# Patient Record
Sex: MALE | Race: WHITE | NOT HISPANIC OR LATINO | Employment: FULL TIME | URBAN - METROPOLITAN AREA
[De-identification: names, ages, dates, MRNs, and addresses within clinical notes are randomized per-mention and may not be internally consistent; named-entity substitution may affect disease eponyms.]

---

## 2023-07-17 LAB
ALANINE AMINOTRANSFERASE (SGPT) (U/L) IN SER/PLAS: 18 U/L (ref 10–52)
ALBUMIN (G/DL) IN SER/PLAS: 4.2 G/DL (ref 3.4–5)
ALKALINE PHOSPHATASE (U/L) IN SER/PLAS: 51 U/L (ref 33–136)
ANION GAP IN SER/PLAS: 11 MMOL/L (ref 10–20)
ASPARTATE AMINOTRANSFERASE (SGOT) (U/L) IN SER/PLAS: 18 U/L (ref 9–39)
BILIRUBIN TOTAL (MG/DL) IN SER/PLAS: 0.6 MG/DL (ref 0–1.2)
CALCIUM (MG/DL) IN SER/PLAS: 8.7 MG/DL (ref 8.6–10.3)
CARBON DIOXIDE, TOTAL (MMOL/L) IN SER/PLAS: 26 MMOL/L (ref 21–32)
CHLORIDE (MMOL/L) IN SER/PLAS: 107 MMOL/L (ref 98–107)
CHOLESTEROL (MG/DL) IN SER/PLAS: 173 MG/DL (ref 0–199)
CHOLESTEROL IN HDL (MG/DL) IN SER/PLAS: 50.4 MG/DL
CHOLESTEROL/HDL RATIO: 3.4
CREATININE (MG/DL) IN SER/PLAS: 1.11 MG/DL (ref 0.5–1.3)
GFR MALE: 75 ML/MIN/1.73M2
GLUCOSE (MG/DL) IN SER/PLAS: 108 MG/DL (ref 74–99)
LDL: 102 MG/DL (ref 0–99)
POTASSIUM (MMOL/L) IN SER/PLAS: 4.5 MMOL/L (ref 3.5–5.3)
PROTEIN TOTAL: 6.3 G/DL (ref 6.4–8.2)
SODIUM (MMOL/L) IN SER/PLAS: 139 MMOL/L (ref 136–145)
TRIGLYCERIDE (MG/DL) IN SER/PLAS: 104 MG/DL (ref 0–149)
UREA NITROGEN (MG/DL) IN SER/PLAS: 29 MG/DL (ref 6–23)
VLDL: 21 MG/DL (ref 0–40)

## 2023-10-20 ENCOUNTER — TELEPHONE (OUTPATIENT)
Dept: PRIMARY CARE | Facility: CLINIC | Age: 63
End: 2023-10-20
Payer: COMMERCIAL

## 2023-10-20 DIAGNOSIS — Z12.11 COLON CANCER SCREENING: Primary | ICD-10-CM

## 2023-10-20 NOTE — TELEPHONE ENCOUNTER
At last appt, you mentioned pt is at the age for a colonoscopy. He has met his insurance deductible for the year, so wants to know if he can go ahead and get that done, as well as any other tests you think would be beneficial for him, before the beginning of the year.

## 2023-10-22 ENCOUNTER — PHARMACY VISIT (OUTPATIENT)
Dept: PHARMACY | Facility: CLINIC | Age: 63
End: 2023-10-22
Payer: COMMERCIAL

## 2023-10-22 PROCEDURE — RXMED WILLOW AMBULATORY MEDICATION CHARGE

## 2023-10-23 ENCOUNTER — PHARMACY VISIT (OUTPATIENT)
Dept: PHARMACY | Facility: CLINIC | Age: 63
End: 2023-10-23
Payer: COMMERCIAL

## 2023-10-23 DIAGNOSIS — K21.00 GASTROESOPHAGEAL REFLUX DISEASE WITH ESOPHAGITIS WITHOUT HEMORRHAGE: Primary | ICD-10-CM

## 2023-10-23 PROCEDURE — RXMED WILLOW AMBULATORY MEDICATION CHARGE

## 2023-10-24 ENCOUNTER — PHARMACY VISIT (OUTPATIENT)
Dept: PHARMACY | Facility: CLINIC | Age: 63
End: 2023-10-24
Payer: COMMERCIAL

## 2023-10-24 PROCEDURE — RXMED WILLOW AMBULATORY MEDICATION CHARGE

## 2023-10-24 RX ORDER — OMEPRAZOLE 20 MG/1
20 CAPSULE, DELAYED RELEASE ORAL DAILY
Qty: 90 CAPSULE | Refills: 1 | Status: SHIPPED | OUTPATIENT
Start: 2023-10-24 | End: 2024-10-22

## 2023-10-24 NOTE — TELEPHONE ENCOUNTER
Pharmacy Request  Pt canceled OV on 8/10/23, would need appt for refills. Looks like he has new PCP. Please confirm and send back if needing short supply. Thanks, AM

## 2024-01-16 DIAGNOSIS — E78.2 MIXED HYPERLIPIDEMIA: Primary | ICD-10-CM

## 2024-01-16 PROCEDURE — RXMED WILLOW AMBULATORY MEDICATION CHARGE

## 2024-01-16 RX ORDER — ATORVASTATIN CALCIUM 20 MG/1
20 TABLET, FILM COATED ORAL DAILY
Qty: 90 TABLET | Refills: 1 | Status: CANCELLED | OUTPATIENT
Start: 2024-01-16 | End: 2025-01-15

## 2024-01-16 RX ORDER — ATORVASTATIN CALCIUM 20 MG/1
20 TABLET, FILM COATED ORAL DAILY
Qty: 90 TABLET | Refills: 1 | Status: SHIPPED | OUTPATIENT
Start: 2024-01-16 | End: 2024-01-31 | Stop reason: SDUPTHER

## 2024-01-17 ENCOUNTER — PHARMACY VISIT (OUTPATIENT)
Dept: PHARMACY | Facility: CLINIC | Age: 64
End: 2024-01-17
Payer: COMMERCIAL

## 2024-01-18 ENCOUNTER — PHARMACY VISIT (OUTPATIENT)
Dept: PHARMACY | Facility: CLINIC | Age: 64
End: 2024-01-18

## 2024-01-18 PROCEDURE — RXMED WILLOW AMBULATORY MEDICATION CHARGE

## 2024-01-19 ENCOUNTER — PHARMACY VISIT (OUTPATIENT)
Dept: PHARMACY | Facility: CLINIC | Age: 64
End: 2024-01-19
Payer: COMMERCIAL

## 2024-01-31 ENCOUNTER — OFFICE VISIT (OUTPATIENT)
Dept: PRIMARY CARE | Facility: CLINIC | Age: 64
End: 2024-01-31
Payer: COMMERCIAL

## 2024-01-31 VITALS
OXYGEN SATURATION: 99 % | HEART RATE: 76 BPM | DIASTOLIC BLOOD PRESSURE: 80 MMHG | WEIGHT: 225 LBS | BODY MASS INDEX: 32.21 KG/M2 | HEIGHT: 70 IN | SYSTOLIC BLOOD PRESSURE: 142 MMHG

## 2024-01-31 DIAGNOSIS — I25.10 CORONARY ARTERY DISEASE DUE TO CALCIFIED CORONARY LESION: ICD-10-CM

## 2024-01-31 DIAGNOSIS — Z12.5 PROSTATE CANCER SCREENING: ICD-10-CM

## 2024-01-31 DIAGNOSIS — I10 HYPERTENSION, UNSPECIFIED TYPE: ICD-10-CM

## 2024-01-31 DIAGNOSIS — Z00.00 WELLNESS EXAMINATION: ICD-10-CM

## 2024-01-31 DIAGNOSIS — I25.84 CORONARY ARTERY DISEASE DUE TO CALCIFIED CORONARY LESION: ICD-10-CM

## 2024-01-31 DIAGNOSIS — R73.03 PREDIABETES: ICD-10-CM

## 2024-01-31 DIAGNOSIS — E78.2 MIXED HYPERLIPIDEMIA: ICD-10-CM

## 2024-01-31 DIAGNOSIS — K21.9 GASTROESOPHAGEAL REFLUX DISEASE WITHOUT ESOPHAGITIS: Primary | ICD-10-CM

## 2024-01-31 PROCEDURE — 3077F SYST BP >= 140 MM HG: CPT | Performed by: INTERNAL MEDICINE

## 2024-01-31 PROCEDURE — 99213 OFFICE O/P EST LOW 20 MIN: CPT | Performed by: INTERNAL MEDICINE

## 2024-01-31 PROCEDURE — 3079F DIAST BP 80-89 MM HG: CPT | Performed by: INTERNAL MEDICINE

## 2024-01-31 RX ORDER — ESCITALOPRAM OXALATE 10 MG/1
TABLET ORAL
COMMUNITY
Start: 2009-10-01 | End: 2024-02-01 | Stop reason: WASHOUT

## 2024-01-31 RX ORDER — LOSARTAN POTASSIUM 100 MG/1
100 TABLET ORAL DAILY
Qty: 90 TABLET | Refills: 2 | Status: SHIPPED | OUTPATIENT
Start: 2024-01-31 | End: 2025-01-30

## 2024-01-31 RX ORDER — ESZOPICLONE 3 MG/1
TABLET, FILM COATED ORAL
COMMUNITY
Start: 2009-09-24 | End: 2024-02-01 | Stop reason: WASHOUT

## 2024-01-31 RX ORDER — OMEPRAZOLE 20 MG/1
CAPSULE, DELAYED RELEASE ORAL
COMMUNITY
Start: 2021-01-13 | End: 2024-01-31 | Stop reason: SDUPTHER

## 2024-01-31 RX ORDER — SILDENAFIL 100 MG/1
TABLET, FILM COATED ORAL
COMMUNITY
Start: 2022-08-09

## 2024-01-31 RX ORDER — CELECOXIB 200 MG/1
CAPSULE ORAL
COMMUNITY
Start: 2021-01-13

## 2024-01-31 RX ORDER — LOSARTAN POTASSIUM 100 MG/1
TABLET ORAL
COMMUNITY
Start: 2022-08-09

## 2024-01-31 RX ORDER — ATORVASTATIN CALCIUM 20 MG/1
TABLET, FILM COATED ORAL
COMMUNITY
Start: 2021-01-13

## 2024-01-31 RX ORDER — ATORVASTATIN CALCIUM 20 MG/1
20 TABLET, FILM COATED ORAL DAILY
Qty: 90 TABLET | Refills: 1 | Status: SHIPPED | OUTPATIENT
Start: 2024-01-31 | End: 2025-01-30

## 2024-01-31 RX ORDER — OMEPRAZOLE 20 MG/1
40 CAPSULE, DELAYED RELEASE ORAL
Qty: 90 CAPSULE | Refills: 1 | Status: SHIPPED | OUTPATIENT
Start: 2024-01-31

## 2024-01-31 NOTE — PROGRESS NOTES
"Subjective   Patient ID: Michael Antonio is a 63 y.o. male who presents for No chief complaint on file..    HPI CAD  LIPIDS   IGT   NO ISSUES     Review of Systems    Objective   /80 (BP Location: Left arm, Patient Position: Sitting, BP Cuff Size: Adult)   Pulse 76   Ht 1.778 m (5' 10\")   Wt 102 kg (225 lb)   SpO2 99%   BMI 32.28 kg/m²     Physical Exam    Assessment/Plan   Diagnoses and all orders for this visit:  Gastroesophageal reflux disease without esophagitis  -     losartan (Cozaar) 100 mg tablet; TAKE 1 TABLET BY MOUTH ONCE DAILY  -     omeprazole (PriLOSEC) 20 mg DR capsule; Take 2 capsules (40 mg) by mouth once daily in the morning. Take before meals.  Mixed hyperlipidemia  -     losartan (Cozaar) 100 mg tablet; TAKE 1 TABLET BY MOUTH ONCE DAILY  -     atorvastatin (Lipitor) 20 mg tablet; TAKE 1 TABLET BY MOUTH ONCE DAILY  -     omeprazole (PriLOSEC) 20 mg DR capsule; Take 2 capsules (40 mg) by mouth once daily in the morning. Take before meals.  Hypertension, unspecified type  -     losartan (Cozaar) 100 mg tablet; TAKE 1 TABLET BY MOUTH ONCE DAILY  -     omeprazole (PriLOSEC) 20 mg DR capsule; Take 2 capsules (40 mg) by mouth once daily in the morning. Take before meals.  Wellness examination  -     CBC; Future  -     Lipid Panel; Future  -     Comprehensive Metabolic Panel; Future  Prostate cancer screening  -     Prostate Specific Antigen, Screen; Future  Prediabetes  -     Hemoglobin A1C; Future  Coronary artery disease due to calcified coronary lesion  Comments:  REMY NEG 2023         "

## 2024-02-02 ENCOUNTER — LAB (OUTPATIENT)
Dept: LAB | Facility: LAB | Age: 64
End: 2024-02-02
Payer: COMMERCIAL

## 2024-02-02 DIAGNOSIS — Z12.5 PROSTATE CANCER SCREENING: ICD-10-CM

## 2024-02-02 DIAGNOSIS — R73.03 PREDIABETES: ICD-10-CM

## 2024-02-02 DIAGNOSIS — Z00.00 WELLNESS EXAMINATION: ICD-10-CM

## 2024-02-02 LAB
ALBUMIN SERPL BCP-MCNC: 4.4 G/DL (ref 3.4–5)
ALP SERPL-CCNC: 64 U/L (ref 33–136)
ALT SERPL W P-5'-P-CCNC: 24 U/L (ref 10–52)
ANION GAP SERPL CALC-SCNC: 13 MMOL/L (ref 10–20)
AST SERPL W P-5'-P-CCNC: 25 U/L (ref 9–39)
BILIRUB SERPL-MCNC: 0.8 MG/DL (ref 0–1.2)
BUN SERPL-MCNC: 23 MG/DL (ref 6–23)
CALCIUM SERPL-MCNC: 9.3 MG/DL (ref 8.6–10.3)
CHLORIDE SERPL-SCNC: 104 MMOL/L (ref 98–107)
CHOLEST SERPL-MCNC: 185 MG/DL (ref 0–199)
CHOLESTEROL/HDL RATIO: 3.8
CO2 SERPL-SCNC: 26 MMOL/L (ref 21–32)
CREAT SERPL-MCNC: 1.1 MG/DL (ref 0.5–1.3)
EGFRCR SERPLBLD CKD-EPI 2021: 75 ML/MIN/1.73M*2
ERYTHROCYTE [DISTWIDTH] IN BLOOD BY AUTOMATED COUNT: 13.3 % (ref 11.5–14.5)
GLUCOSE SERPL-MCNC: 102 MG/DL (ref 74–99)
HCT VFR BLD AUTO: 45.4 % (ref 41–52)
HDLC SERPL-MCNC: 48.4 MG/DL
HGB BLD-MCNC: 15.3 G/DL (ref 13.5–17.5)
LDLC SERPL CALC-MCNC: 107 MG/DL
MCH RBC QN AUTO: 28.7 PG (ref 26–34)
MCHC RBC AUTO-ENTMCNC: 33.7 G/DL (ref 32–36)
MCV RBC AUTO: 85 FL (ref 80–100)
NON HDL CHOLESTEROL: 137 MG/DL (ref 0–149)
NRBC BLD-RTO: 0 /100 WBCS (ref 0–0)
PLATELET # BLD AUTO: 310 X10*3/UL (ref 150–450)
POTASSIUM SERPL-SCNC: 4.4 MMOL/L (ref 3.5–5.3)
PROT SERPL-MCNC: 6.6 G/DL (ref 6.4–8.2)
PSA SERPL-MCNC: 3.12 NG/ML
RBC # BLD AUTO: 5.34 X10*6/UL (ref 4.5–5.9)
SODIUM SERPL-SCNC: 139 MMOL/L (ref 136–145)
TRIGL SERPL-MCNC: 147 MG/DL (ref 0–149)
VLDL: 29 MG/DL (ref 0–40)
WBC # BLD AUTO: 7.5 X10*3/UL (ref 4.4–11.3)

## 2024-02-02 PROCEDURE — 84153 ASSAY OF PSA TOTAL: CPT

## 2024-02-02 PROCEDURE — 83036 HEMOGLOBIN GLYCOSYLATED A1C: CPT

## 2024-02-02 PROCEDURE — 36415 COLL VENOUS BLD VENIPUNCTURE: CPT

## 2024-02-02 PROCEDURE — 80061 LIPID PANEL: CPT

## 2024-02-02 PROCEDURE — 80053 COMPREHEN METABOLIC PANEL: CPT

## 2024-02-02 PROCEDURE — 85027 COMPLETE CBC AUTOMATED: CPT

## 2024-02-03 LAB
EST. AVERAGE GLUCOSE BLD GHB EST-MCNC: 131 MG/DL
HBA1C MFR BLD: 6.2 %

## 2024-02-05 DIAGNOSIS — R97.20 INCREASING PROSTATE SPECIFIC ANTIGEN LEVEL: Primary | ICD-10-CM

## 2024-03-04 ENCOUNTER — OFFICE VISIT (OUTPATIENT)
Dept: UROLOGY | Facility: CLINIC | Age: 64
End: 2024-03-04
Payer: COMMERCIAL

## 2024-03-04 DIAGNOSIS — R97.20 INCREASING PROSTATE SPECIFIC ANTIGEN LEVEL: ICD-10-CM

## 2024-03-04 DIAGNOSIS — N40.1 BENIGN PROSTATIC HYPERPLASIA WITH LOWER URINARY TRACT SYMPTOMS, SYMPTOM DETAILS UNSPECIFIED: Primary | ICD-10-CM

## 2024-03-04 DIAGNOSIS — N52.8 OTHER MALE ERECTILE DYSFUNCTION: ICD-10-CM

## 2024-03-04 LAB
POC BILIRUBIN, URINE: NEGATIVE
POC BLOOD, URINE: NEGATIVE
POC GLUCOSE, URINE: NEGATIVE MG/DL
POC KETONES, URINE: NEGATIVE MG/DL
POC LEUKOCYTES, URINE: NEGATIVE
POC NITRITE,URINE: NEGATIVE
POC PH, URINE: 7 PH
POC PROTEIN, URINE: NEGATIVE MG/DL
POC SPECIFIC GRAVITY, URINE: 1.02
POC UROBILINOGEN, URINE: 0.2 EU/DL

## 2024-03-04 PROCEDURE — 99204 OFFICE O/P NEW MOD 45 MIN: CPT | Performed by: UROLOGY

## 2024-03-04 PROCEDURE — 81003 URINALYSIS AUTO W/O SCOPE: CPT | Performed by: UROLOGY

## 2024-03-04 RX ORDER — TADALAFIL 10 MG/1
10 TABLET ORAL DAILY PRN
Qty: 5 TABLET | Refills: 0 | Status: SHIPPED | OUTPATIENT
Start: 2024-03-04 | End: 2024-03-08 | Stop reason: SDUPTHER

## 2024-03-04 NOTE — PROGRESS NOTES
03/04/2024  63-year-old gentleman presents concerning PSA, ED    Patient has no nausea, no vomiting, no fever.    Exam: Circumcised, normal penis normal testes bilaterally    ANTON: 1+, no nodule    We discussed PSA screening, PSA velocity, close watch PSA in 6 months and 1 year  We discussed erectile dysfunction, side effect of Viagra, switch to Cialis  We discussed benign prostate hypertrophy with minimal voiding symptom  All the questions were answered, the patient expressed understanding and agreed to the plan.    Impression  PSA screening  BPH  ED    Plan  Check PSA in 6 months, 1 year  Cialis 10 mg twice week trial x 5  Appointment in 1 year    New Patient -Referred by Dr. Jarad Brown     Chief Complaint   Patient presents with    Increasing PSA      Patient voiding well no complaints. Would like to discuss other medication other than the Viagra .        Physical Exam     TODAYS LAB RESULTS:  PSA  02/02/24  3.12         Component  Ref Range & Units 09:33   POC Glucose, Urine  NEGATIVE mg/dl NEGATIVE   POC Bilirubin, Urine  NEGATIVE NEGATIVE   POC Ketones, Urine  NEGATIVE mg/dl NEGATIVE   POC Specific Gravity, Urine  1.005 - 1.035 1.020   POC Blood, Urine  NEGATIVE NEGATIVE   POC PH, Urine  No Reference Range Established PH 7.0   POC Protein, Urine  NEGATIVE, 30 (1+) mg/dl NEGATIVE   POC Urobilinogen, Urine  0.2, 1.0 EU/DL 0.2   Poc Nitrite, Urine  NEGATIVE NEGATIVE   POC Leukocytes, Urine  NEGATIVE NEGATIVE          ASSESSMENT&PLAN:      IMPRESSIONS:     PSA   02/02/24 3.12   8/19/2022 1.26  2/11/2022 1.14  8/20/2021 1.11  1/16/2020 0.9

## 2024-03-07 ENCOUNTER — PHARMACY VISIT (OUTPATIENT)
Dept: PHARMACY | Facility: CLINIC | Age: 64
End: 2024-03-07

## 2024-03-07 PROCEDURE — RXMED WILLOW AMBULATORY MEDICATION CHARGE

## 2024-04-15 DIAGNOSIS — E78.2 MIXED HYPERLIPIDEMIA: Primary | ICD-10-CM

## 2024-04-15 PROCEDURE — RXMED WILLOW AMBULATORY MEDICATION CHARGE

## 2024-04-15 RX ORDER — CELECOXIB 200 MG/1
200 CAPSULE ORAL
Qty: 90 CAPSULE | Refills: 1 | Status: SHIPPED | OUTPATIENT
Start: 2024-04-15 | End: 2025-04-15

## 2024-04-16 ENCOUNTER — PHARMACY VISIT (OUTPATIENT)
Dept: PHARMACY | Facility: CLINIC | Age: 64
End: 2024-04-16
Payer: COMMERCIAL

## 2024-04-18 ENCOUNTER — PHARMACY VISIT (OUTPATIENT)
Dept: PHARMACY | Facility: CLINIC | Age: 64
End: 2024-04-18

## 2024-07-05 ENCOUNTER — OFFICE VISIT (OUTPATIENT)
Dept: PRIMARY CARE | Facility: CLINIC | Age: 64
End: 2024-07-05
Payer: COMMERCIAL

## 2024-07-05 VITALS — WEIGHT: 225 LBS | HEIGHT: 70 IN | BODY MASS INDEX: 32.21 KG/M2

## 2024-07-05 DIAGNOSIS — R21 SKIN RASH: ICD-10-CM

## 2024-07-05 DIAGNOSIS — Z88.9 DRUG ALLERGY: Primary | ICD-10-CM

## 2024-07-05 PROCEDURE — 99213 OFFICE O/P EST LOW 20 MIN: CPT | Performed by: INTERNAL MEDICINE

## 2024-07-05 PROCEDURE — 1036F TOBACCO NON-USER: CPT | Performed by: INTERNAL MEDICINE

## 2024-07-05 RX ORDER — METHYLPREDNISOLONE 4 MG/1
TABLET ORAL
Qty: 21 TABLET | Refills: 0 | Status: SHIPPED | OUTPATIENT
Start: 2024-07-05 | End: 2024-07-12

## 2024-07-05 NOTE — PROGRESS NOTES
Subjective   Patient ID: Michael Antonio is a 64 y.o. male who presents for     He took Ibuprofen recently for joint pain, developed diffuse itchy pinkish rash over arms and left gluteal region , his upper lip  feels heavy. No lip or tongue swelling , dyspnea , wheezing, swallowing difficulty. He denies exposure to Poison Ivy plants , working out in the yard.   Similar reaction to Ibuprofen happened a while back.          Review of Systems    See HPI    Physical Exam  Constitutional:       Appearance: Normal appearance.   HENT:      Mouth/Throat:      Mouth: Mucous membranes are moist.      Pharynx: Oropharynx is clear.      Comments: No lip , tongue swelling  Eyes:      Conjunctiva/sclera: Conjunctivae normal.   Cardiovascular:      Rate and Rhythm: Normal rate and regular rhythm.      Heart sounds: Normal heart sounds.   Pulmonary:      Effort: Pulmonary effort is normal.      Breath sounds: Normal breath sounds.   Abdominal:      General: Abdomen is flat.      Palpations: Abdomen is soft.   Skin:     General: Skin is warm and dry.      Findings: Rash present.      Comments: Pinkish rash over right arm, and left gluteal area    Neurological:      Mental Status: He is alert.         Assessment/Plan        Allergic reaction to Ibuprofen   Skin Rash     He has known allergy to NSAIDs - pl. Do not take these meds ( explained names , group  )  Hydration , Benadryl tab, Calamine lotion  Tylenol for pain or  fever  Medrol dosepak  Reassurance

## 2024-07-19 ENCOUNTER — LAB (OUTPATIENT)
Dept: LAB | Facility: LAB | Age: 64
End: 2024-07-19
Payer: COMMERCIAL

## 2024-07-19 ENCOUNTER — OFFICE VISIT (OUTPATIENT)
Dept: PRIMARY CARE | Facility: CLINIC | Age: 64
End: 2024-07-19
Payer: COMMERCIAL

## 2024-07-19 ENCOUNTER — APPOINTMENT (OUTPATIENT)
Dept: PRIMARY CARE | Facility: CLINIC | Age: 64
End: 2024-07-19

## 2024-07-19 VITALS
BODY MASS INDEX: 32.28 KG/M2 | SYSTOLIC BLOOD PRESSURE: 122 MMHG | RESPIRATION RATE: 16 BRPM | DIASTOLIC BLOOD PRESSURE: 70 MMHG | TEMPERATURE: 96.9 F | WEIGHT: 225 LBS | HEART RATE: 66 BPM

## 2024-07-19 DIAGNOSIS — R21 SKIN RASH: ICD-10-CM

## 2024-07-19 DIAGNOSIS — R61 DIAPHORESIS: ICD-10-CM

## 2024-07-19 DIAGNOSIS — T78.2XXS ANAPHYLAXIS, SEQUELA: ICD-10-CM

## 2024-07-19 DIAGNOSIS — T78.2XXS ANAPHYLAXIS, SEQUELA: Primary | ICD-10-CM

## 2024-07-19 LAB
ALBUMIN SERPL BCP-MCNC: 4.1 G/DL (ref 3.4–5)
ALP SERPL-CCNC: 63 U/L (ref 33–136)
ALT SERPL W P-5'-P-CCNC: 22 U/L (ref 10–52)
ANION GAP SERPL CALC-SCNC: 13 MMOL/L (ref 10–20)
AST SERPL W P-5'-P-CCNC: 16 U/L (ref 9–39)
BASOPHILS # BLD AUTO: 0.03 X10*3/UL (ref 0–0.1)
BASOPHILS NFR BLD AUTO: 0.2 %
BILIRUB SERPL-MCNC: 0.7 MG/DL (ref 0–1.2)
BUN SERPL-MCNC: 23 MG/DL (ref 6–23)
CALCIUM SERPL-MCNC: 9.9 MG/DL (ref 8.6–10.3)
CHLORIDE SERPL-SCNC: 103 MMOL/L (ref 98–107)
CO2 SERPL-SCNC: 26 MMOL/L (ref 21–32)
CREAT SERPL-MCNC: 1.01 MG/DL (ref 0.5–1.3)
CRP SERPL-MCNC: 0.73 MG/DL
EGFRCR SERPLBLD CKD-EPI 2021: 83 ML/MIN/1.73M*2
EOSINOPHIL # BLD AUTO: 0.31 X10*3/UL (ref 0–0.7)
EOSINOPHIL NFR BLD AUTO: 2.6 %
ERYTHROCYTE [DISTWIDTH] IN BLOOD BY AUTOMATED COUNT: 13.7 % (ref 11.5–14.5)
ERYTHROCYTE [SEDIMENTATION RATE] IN BLOOD BY WESTERGREN METHOD: 21 MM/H (ref 0–20)
GLUCOSE SERPL-MCNC: 119 MG/DL (ref 74–99)
HCT VFR BLD AUTO: 46.9 % (ref 41–52)
HGB BLD-MCNC: 15.7 G/DL (ref 13.5–17.5)
IMM GRANULOCYTES # BLD AUTO: 0.03 X10*3/UL (ref 0–0.7)
IMM GRANULOCYTES NFR BLD AUTO: 0.2 % (ref 0–0.9)
LYMPHOCYTES # BLD AUTO: 1.5 X10*3/UL (ref 1.2–4.8)
LYMPHOCYTES NFR BLD AUTO: 12.4 %
MCH RBC QN AUTO: 28.3 PG (ref 26–34)
MCHC RBC AUTO-ENTMCNC: 33.5 G/DL (ref 32–36)
MCV RBC AUTO: 85 FL (ref 80–100)
MONOCYTES # BLD AUTO: 0.94 X10*3/UL (ref 0.1–1)
MONOCYTES NFR BLD AUTO: 7.7 %
NEUTROPHILS # BLD AUTO: 9.33 X10*3/UL (ref 1.2–7.7)
NEUTROPHILS NFR BLD AUTO: 76.9 %
NRBC BLD-RTO: 0 /100 WBCS (ref 0–0)
PLATELET # BLD AUTO: 316 X10*3/UL (ref 150–450)
POTASSIUM SERPL-SCNC: 4 MMOL/L (ref 3.5–5.3)
PROT SERPL-MCNC: 6.7 G/DL (ref 6.4–8.2)
RBC # BLD AUTO: 5.54 X10*6/UL (ref 4.5–5.9)
SODIUM SERPL-SCNC: 138 MMOL/L (ref 136–145)
TSH SERPL-ACNC: 1.86 MIU/L (ref 0.44–3.98)
WBC # BLD AUTO: 12.1 X10*3/UL (ref 4.4–11.3)

## 2024-07-19 PROCEDURE — 86140 C-REACTIVE PROTEIN: CPT

## 2024-07-19 PROCEDURE — 85025 COMPLETE CBC W/AUTO DIFF WBC: CPT

## 2024-07-19 PROCEDURE — 84443 ASSAY THYROID STIM HORMONE: CPT

## 2024-07-19 PROCEDURE — 85652 RBC SED RATE AUTOMATED: CPT

## 2024-07-19 PROCEDURE — 80053 COMPREHEN METABOLIC PANEL: CPT

## 2024-07-19 RX ORDER — FAMOTIDINE 40 MG/1
40 TABLET, FILM COATED ORAL 2 TIMES DAILY
Qty: 60 TABLET | Refills: 0 | Status: SHIPPED | OUTPATIENT
Start: 2024-07-19 | End: 2024-08-18

## 2024-07-19 NOTE — PROGRESS NOTES
"Subjective   Patient ID: Ramirez Antonio \"Aliza" is a 64 y.o. male who presents for Allergic Reaction (Allergic reaction for about 2 weeks /Has been treated with steroids and epi-pen  ).  HPI  Patient is here with his spouse    he used topical NSAIDs on his hand , followed by a rash and swellign of the neck and oral mucousa    Just took benadryl , did not go to the ER     He came and saw Dr. Linder and prescribe medrol dosed pack , felt better , however when he stopped it the hive restarted     So he wtn to urgent care , for anaphylaxis and required epipen use, they prescribe benadryl , famotidine and prednsione , and he took last prednisone 2 days ago on  .     He stopped his medication and was going to start reintroduce them , he has CAD on CT scan , no hx of intervention , takes atorvastatin , aspirin and losartan.     Never seen an allergist.       Today patient walked here from home and he was feeling sweaty.   \Not feelign nauseous.   Restarted losartan today     Completed steroid after the dose level.         Review of Systems    No past medical history on file.    No past surgical history on file.   Social History     Socioeconomic History    Marital status:    Tobacco Use    Smoking status: Never    Smokeless tobacco: Never   Substance and Sexual Activity    Alcohol use: Yes    Drug use: Never      No family history on file.    MEDICATIONS AND ALLERGIES:    ALLERGIES Nsaids (non-steroidal anti-inflammatory drug)    MEDICATIONS   Current Outpatient Medications on File Prior to Visit   Medication Sig Dispense Refill    atorvastatin (Lipitor) 20 mg tablet TAKE 1 TABLET BY MOUTH ONCE DAILY 90 tablet 1    celecoxib (CeleBREX) 200 mg capsule TAKE 1 CAPSULE BY MOUTH ONCE DAILY WITH FOOD 90 capsule 1    losartan (Cozaar) 100 mg tablet TAKE 1 TABLET BY MOUTH ONCE DAILY 90 tablet 2    [] methylPREDNISolone (Medrol Dospak) 4 mg tablets Take as directed on package. 21 tablet 0    " "omeprazole (PriLOSEC) 20 mg DR capsule TAKE 1 CAPSULE BY MOUTH ONCE DAILY (Patient not taking: Reported on 7/5/2024) 90 capsule 1    omeprazole (PriLOSEC) 20 mg DR capsule Take 2 capsules (40 mg) by mouth once daily in the morning. Take before meals. 90 capsule 1    sildenafil (Viagra) 100 mg tablet TAKE 1 TABLET BY MOUTH ONCE DAILY AS NEEDED (Patient not taking: Reported on 7/5/2024) 30 tablet 5    sildenafil (Viagra) 100 mg tablet Take by mouth.      tadalafil (Cialis) 10 mg tablet Take 1 tablet (10 mg) by mouth once daily as needed for erectile dysfunction. 10 tablet 3    [DISCONTINUED] atorvastatin (Lipitor) 20 mg tablet Take by mouth.      [DISCONTINUED] celecoxib (CeleBREX) 200 mg capsule Take by mouth.      [DISCONTINUED] losartan (Cozaar) 100 mg tablet Take by mouth.       No current facility-administered medications on file prior to visit.              Objective   Visit Vitals  /70 (BP Location: Left arm, Patient Position: Sitting)   Pulse 66   Temp 36.1 °C (96.9 °F) (Temporal)   Resp 16   Wt 102 kg (225 lb)   BMI 32.28 kg/m²   Smoking Status Never   BSA 2.24 m²          1/29/2020     9:01 AM 7/28/2020     9:22 AM 11/19/2021     9:49 AM 1/18/2022     5:41 PM 1/31/2024     8:42 AM 7/5/2024     9:44 AM 7/19/2024     8:52 AM   Vitals   Systolic 128 128   142  122   Diastolic 86 76   80  70   Heart Rate 68 62   76  66   Temp       36.1 °C (96.9 °F)   Resp 16 16     16   Height (in) 1.778 m (5' 10\") 1.778 m (5' 10\") 1.778 m (5' 10\") 1.778 m (5' 10\") 1.778 m (5' 10\") 1.778 m (5' 10\")    Weight (lb) 218.6 217.4 215 215 225 225 225   BMI 31.37 kg/m2 31.19 kg/m2 30.85 kg/m2 30.85 kg/m2 32.28 kg/m2 32.28 kg/m2 32.28 kg/m2   BSA (m2) 2.21 m2 2.21 m2 2.19 m2 2.19 m2 2.24 m2 2.24 m2 2.24 m2   Visit Report     Report Report Report     Physical Exam        Assessment & Plan  Anaphylaxis, sequela  Patient with anaphylactic reaction   Advised to hold Losartan because it can cause angioedema , and check his bP , goal " to be less than 130/80, contact us if it is running highter that that   Stop the Aspriin because it is an NSAID   Stop the celebrex   Stop OTC medxcition   Will refer to allergist to see what would they recommedn , he might need reintroducing medciation under supervision.     Orders:    CBC and Auto Differential; Future    Comprehensive metabolic panel; Future    TSH with reflex to Free T4 if abnormal; Future    Referral to Allergy; Future    famotidine (Pepcid) 40 mg tablet; Take 1 tablet (40 mg) by mouth 2 times a day.    Sedimentation Rate; Future    C-reactive protein; Future    Diaphoresis  Walked here from home   Feeling well   Room was hot per patietn and spouse.   Will roder labs below   Orders:    CBC and Auto Differential; Future    Comprehensive metabolic panel; Future    TSH with reflex to Free T4 if abnormal; Future    Referral to Allergy; Future    famotidine (Pepcid) 40 mg tablet; Take 1 tablet (40 mg) by mouth 2 times a day.    Sedimentation Rate; Future    C-reactive protein; Future    Skin rash  As above  Orders:    CBC and Auto Differential; Future    Comprehensive metabolic panel; Future    TSH with reflex to Free T4 if abnormal; Future    Referral to Allergy; Future    famotidine (Pepcid) 40 mg tablet; Take 1 tablet (40 mg) by mouth 2 times a day.    Sedimentation Rate; Future    C-reactive protein; Future

## 2024-07-19 NOTE — PATIENT INSTRUCTIONS
Target blood pressure is 130/80 or less   25 mg every 4 to 6 hours or 50 mg every 6 to 8 hours as needed for allergic reaction.

## 2024-08-02 ENCOUNTER — APPOINTMENT (OUTPATIENT)
Dept: PRIMARY CARE | Facility: CLINIC | Age: 64
End: 2024-08-02
Payer: COMMERCIAL

## 2024-08-02 VITALS
RESPIRATION RATE: 18 BRPM | WEIGHT: 221.8 LBS | DIASTOLIC BLOOD PRESSURE: 80 MMHG | HEIGHT: 70 IN | HEART RATE: 63 BPM | BODY MASS INDEX: 31.75 KG/M2 | SYSTOLIC BLOOD PRESSURE: 120 MMHG | OXYGEN SATURATION: 97 % | TEMPERATURE: 93.7 F

## 2024-08-02 DIAGNOSIS — I10 HYPERTENSION, UNSPECIFIED TYPE: ICD-10-CM

## 2024-08-02 DIAGNOSIS — T78.2XXS ANAPHYLAXIS, SEQUELA: Primary | ICD-10-CM

## 2024-08-02 DIAGNOSIS — K21.9 GASTROESOPHAGEAL REFLUX DISEASE WITHOUT ESOPHAGITIS: ICD-10-CM

## 2024-08-02 DIAGNOSIS — E78.2 MIXED HYPERLIPIDEMIA: ICD-10-CM

## 2024-08-02 DIAGNOSIS — R93.1 HIGH CORONARY ARTERY CALCIUM SCORE: ICD-10-CM

## 2024-08-02 DIAGNOSIS — T78.40XS ALLERGY, SEQUELA: ICD-10-CM

## 2024-08-02 PROBLEM — T78.2XXA ANAPHYLACTIC SYNDROME: Status: ACTIVE | Noted: 2024-08-02

## 2024-08-02 PROBLEM — T78.40XA ALLERGIES: Status: ACTIVE | Noted: 2024-08-02

## 2024-08-02 PROCEDURE — 3079F DIAST BP 80-89 MM HG: CPT | Performed by: INTERNAL MEDICINE

## 2024-08-02 PROCEDURE — RXMED WILLOW AMBULATORY MEDICATION CHARGE

## 2024-08-02 PROCEDURE — 3008F BODY MASS INDEX DOCD: CPT | Performed by: INTERNAL MEDICINE

## 2024-08-02 PROCEDURE — 99213 OFFICE O/P EST LOW 20 MIN: CPT | Performed by: INTERNAL MEDICINE

## 2024-08-02 PROCEDURE — 3074F SYST BP LT 130 MM HG: CPT | Performed by: INTERNAL MEDICINE

## 2024-08-02 RX ORDER — LOSARTAN POTASSIUM 100 MG/1
100 TABLET ORAL DAILY
Qty: 90 TABLET | Refills: 2 | Status: SHIPPED | OUTPATIENT
Start: 2024-08-02 | End: 2025-08-02

## 2024-08-02 RX ORDER — ATORVASTATIN CALCIUM 20 MG/1
20 TABLET, FILM COATED ORAL DAILY
Qty: 90 TABLET | Refills: 1 | Status: SHIPPED | OUTPATIENT
Start: 2024-08-02 | End: 2025-08-02

## 2024-08-02 NOTE — PROGRESS NOTES
"Subjective   Patient ID: Mihcael Antonio is a 64 y.o. male who presents for Follow-up (6 Months).    HPI  2 episodes of allergy   Ibuprofen  caused sign response  Had recurrence  Then medrol better then recurrence  Urgent care pepcid and pred  Better then went off meds back in except asa and NO hives         Review of Systems    Objective   /80 (BP Location: Left arm, Patient Position: Sitting, BP Cuff Size: Adult)   Pulse 63   Temp 34.3 °C (93.7 °F) (Temporal)   Resp 18   Ht 1.778 m (5' 10\")   Wt 101 kg (221 lb 12.8 oz)   SpO2 97%   BMI 31.82 kg/m²     Physical Exam    Assessment/Plan   Diagnoses and all orders for this visit:  Anaphylaxis, sequela  Comments:  he needs to see an allergist i told him so  Allergy, sequela  High coronary artery calcium score  Gastroesophageal reflux disease without esophagitis  Comments:  went to pepcid  Other orders  -     Follow Up In Primary Care - Established         "

## 2024-08-05 ENCOUNTER — PHARMACY VISIT (OUTPATIENT)
Dept: PHARMACY | Facility: CLINIC | Age: 64
End: 2024-08-05
Payer: COMMERCIAL

## 2024-08-08 ENCOUNTER — LAB (OUTPATIENT)
Dept: LAB | Facility: LAB | Age: 64
End: 2024-08-08
Payer: COMMERCIAL

## 2024-08-08 DIAGNOSIS — R97.20 INCREASING PROSTATE SPECIFIC ANTIGEN LEVEL: ICD-10-CM

## 2024-08-08 DIAGNOSIS — N40.1 BENIGN PROSTATIC HYPERPLASIA WITH LOWER URINARY TRACT SYMPTOMS, SYMPTOM DETAILS UNSPECIFIED: ICD-10-CM

## 2024-08-08 DIAGNOSIS — K21.9 GASTROESOPHAGEAL REFLUX DISEASE WITHOUT ESOPHAGITIS: ICD-10-CM

## 2024-08-08 DIAGNOSIS — T78.2XXS ANAPHYLAXIS, SEQUELA: ICD-10-CM

## 2024-08-08 DIAGNOSIS — N52.8 OTHER MALE ERECTILE DYSFUNCTION: ICD-10-CM

## 2024-08-08 DIAGNOSIS — Z12.5 SCREENING PSA (PROSTATE SPECIFIC ANTIGEN): Primary | ICD-10-CM

## 2024-08-08 DIAGNOSIS — T78.40XS ALLERGY, SEQUELA: ICD-10-CM

## 2024-08-08 DIAGNOSIS — R93.1 HIGH CORONARY ARTERY CALCIUM SCORE: ICD-10-CM

## 2024-08-08 LAB
ALBUMIN SERPL BCP-MCNC: 4.2 G/DL (ref 3.4–5)
ALP SERPL-CCNC: 74 U/L (ref 33–136)
ALT SERPL W P-5'-P-CCNC: 16 U/L (ref 10–52)
AST SERPL W P-5'-P-CCNC: 19 U/L (ref 9–39)
BILIRUB DIRECT SERPL-MCNC: 0.1 MG/DL (ref 0–0.3)
BILIRUB SERPL-MCNC: 0.5 MG/DL (ref 0–1.2)
CHOLEST SERPL-MCNC: 185 MG/DL (ref 0–199)
CHOLESTEROL/HDL RATIO: 4.1
HDLC SERPL-MCNC: 44.6 MG/DL
LDLC SERPL CALC-MCNC: 103 MG/DL
NON HDL CHOLESTEROL: 140 MG/DL (ref 0–149)
PROT SERPL-MCNC: 6.6 G/DL (ref 6.4–8.2)
PSA SERPL-MCNC: 1.5 NG/ML
TRIGL SERPL-MCNC: 187 MG/DL (ref 0–149)
VLDL: 37 MG/DL (ref 0–40)

## 2024-08-08 PROCEDURE — 84153 ASSAY OF PSA TOTAL: CPT

## 2024-08-08 PROCEDURE — 80061 LIPID PANEL: CPT

## 2024-08-08 PROCEDURE — 83036 HEMOGLOBIN GLYCOSYLATED A1C: CPT

## 2024-08-08 PROCEDURE — 80076 HEPATIC FUNCTION PANEL: CPT

## 2024-08-09 LAB
EST. AVERAGE GLUCOSE BLD GHB EST-MCNC: 140 MG/DL
HBA1C MFR BLD: 6.5 %

## 2024-08-10 DIAGNOSIS — R21 SKIN RASH: ICD-10-CM

## 2024-08-10 DIAGNOSIS — T78.2XXS ANAPHYLAXIS, SEQUELA: ICD-10-CM

## 2024-08-10 DIAGNOSIS — R61 DIAPHORESIS: ICD-10-CM

## 2024-08-12 RX ORDER — FAMOTIDINE 40 MG/1
40 TABLET, FILM COATED ORAL 2 TIMES DAILY
Qty: 180 TABLET | Refills: 1 | Status: SHIPPED | OUTPATIENT
Start: 2024-08-12

## 2024-10-17 PROCEDURE — RXMED WILLOW AMBULATORY MEDICATION CHARGE

## 2024-10-18 PROCEDURE — RXMED WILLOW AMBULATORY MEDICATION CHARGE

## 2024-10-21 ENCOUNTER — PHARMACY VISIT (OUTPATIENT)
Dept: PHARMACY | Facility: CLINIC | Age: 64
End: 2024-10-21
Payer: COMMERCIAL

## 2024-11-21 ENCOUNTER — APPOINTMENT (OUTPATIENT)
Dept: ALLERGY | Facility: CLINIC | Age: 64
End: 2024-11-21
Payer: COMMERCIAL

## 2024-11-21 VITALS
WEIGHT: 228 LBS | BODY MASS INDEX: 32.71 KG/M2 | DIASTOLIC BLOOD PRESSURE: 83 MMHG | HEART RATE: 60 BPM | SYSTOLIC BLOOD PRESSURE: 163 MMHG

## 2024-11-21 DIAGNOSIS — Z88.6 ALLERGY TO NONSTEROIDAL ANTI-INFLAMMATORY DRUG (NSAID): ICD-10-CM

## 2024-11-21 DIAGNOSIS — T50.995A ADVERSE EFFECT OF OTHER DRUGS, MEDICAMENTS AND BIOLOGICAL SUBSTANCES, INITIAL ENCOUNTER: Primary | ICD-10-CM

## 2024-11-21 DIAGNOSIS — T78.2XXA ANAPHYLAXIS, INITIAL ENCOUNTER: ICD-10-CM

## 2024-11-21 PROCEDURE — 99244 OFF/OP CNSLTJ NEW/EST MOD 40: CPT | Performed by: ALLERGY & IMMUNOLOGY

## 2024-11-21 ASSESSMENT — ENCOUNTER SYMPTOMS: ALLERGIC REACTION: 1

## 2024-11-21 NOTE — LETTER
"November 21, 2024     Jarad Brown MD  [address] [facility] [address]  [place] [address]    Patient: Michael Antonio   YOB: 1960   Date of Visit: 11/21/2024       Dear Dr. Jarad Brown MD:    Thank you for referring Michael Antonio to me for evaluation. Below are my notes for this consultation.  If you have questions, please do not hesitate to call me. I look forward to following your patient along with you.       Sincerely,     Mark Armstrong MD      CC: No Recipients  ______________________________________________________________________________________    Subjective  Patient ID:   [id number]   Ramirez Antonio \"Michael\" is a 64 y.o. male who presents for Allergic Reaction (Pt presents today for anaphylaxis reaction to Ibuprofen over the summer    ).    Chief Complaint   Patient presents with   • Allergic Reaction     Pt presents today for anaphylaxis reaction to Ibuprofen over the summer              Allergic Reaction      This patient is here to evaluate for:  Michael is here with his wife, Esha  Sent by Dr. Brown    History of nsaid allergy.    Had a 3 week reaction from Voltaren  He accidentally had voltaren cream on left dorsal hand - within an hour, his whole face swelled  Used benadryl.  He did not want to go to the ED  Large round hives occurred, quarter sized; and they were pruritic.     Saw PCP and did medrol 6 taper - helped but then it recurred.  Hives on arms and stomach.     Lip tingling, then went to Urgent care - got a shot of epinephrine (and a scrip)  Prednisone, famotidine, and benadryl. The epinephrine cleared up his symptoms quickly and after the 5 days of steroids, it recurred.     Approx 20 years ago, had ibuprofen and had hives.   No hives since then.   Ok with tylenol and celebrex.  Likely had it in May '24 without problems, on as needed; not daily.     Had been on baby aspirin, per cardiologist.       Review of Systems   All other systems reviewed and are " negative.        Objective    /83 (BP Location: Left arm, Patient Position: Sitting, BP Cuff Size: Adult)   Pulse 60   Wt 103 kg (228 lb)   BMI 32.71 kg/m²      Physical Exam  Constitutional:       General: He is not in acute distress.     Appearance: Normal appearance. He is not ill-appearing.   HENT:      Head: Normocephalic and atraumatic.      Right Ear: Tympanic membrane, ear canal and external ear normal.      Left Ear: Tympanic membrane, ear canal and external ear normal.      Nose: Nose normal. No congestion or rhinorrhea.      Mouth/Throat:      Mouth: Mucous membranes are moist.      Pharynx: Oropharynx is clear. No oropharyngeal exudate or posterior oropharyngeal erythema.   Eyes:      General:         Right eye: No discharge.         Left eye: No discharge.      Conjunctiva/sclera: Conjunctivae normal.   Cardiovascular:      Rate and Rhythm: Normal rate and regular rhythm.      Heart sounds: Normal heart sounds. No murmur heard.     No friction rub. No gallop.   Pulmonary:      Effort: Pulmonary effort is normal. No respiratory distress.      Breath sounds: Normal breath sounds. No stridor. No wheezing, rhonchi or rales.   Chest:      Chest wall: No tenderness.   Abdominal:      General: Abdomen is flat.      Palpations: Abdomen is soft.   Musculoskeletal:         General: Normal range of motion.      Cervical back: Normal range of motion and neck supple.   Lymphadenopathy:      Cervical: No cervical adenopathy.   Skin:     General: Skin is warm and dry.      Findings: No erythema, lesion or rash.   Neurological:      General: No focal deficit present.      Mental Status: He is alert. Mental status is at baseline.   Psychiatric:         Mood and Affect: Mood normal.         Behavior: Behavior normal.         Thought Content: Thought content normal.         Judgment: Judgment normal.            Current Outpatient Medications   Medication Sig Dispense Refill   • amLODIPine (Norvasc) 5 mg tablet  Take 1 tablet by mouth once a day 90 days 90 tablet 3   • atorvastatin (Lipitor) 20 mg tablet TAKE 1 TABLET BY MOUTH ONCE DAILY 90 tablet 1   • atorvastatin (Lipitor) 40 mg tablet Take 1 tablet (40 mg) by mouth once daily. 90 tablet 3   • celecoxib (CeleBREX) 200 mg capsule TAKE 1 CAPSULE BY MOUTH ONCE DAILY WITH FOOD 90 capsule 1   • famotidine (Pepcid) 40 mg tablet TAKE 1 TABLET BY MOUTH 2 TIMES A  tablet 1   • losartan (Cozaar) 100 mg tablet TAKE 1 TABLET BY MOUTH ONCE DAILY 90 tablet 2   • omeprazole (PriLOSEC) 20 mg DR capsule Take 2 capsules (40 mg) by mouth once daily in the morning. Take before meals. 90 capsule 1     No current facility-administered medications for this visit.       Summary of the labs over the past 6 months:    Lab on 08/08/2024   Component Date Value Ref Range Status   • Prostate Specific AG 08/08/2024 1.50  <=4.00 ng/mL Final   • Albumin 08/08/2024 4.2  3.4 - 5.0 g/dL Final   • Bilirubin, Total 08/08/2024 0.5  0.0 - 1.2 mg/dL Final   • Bilirubin, Direct 08/08/2024 0.1  0.0 - 0.3 mg/dL Final   • Alkaline Phosphatase 08/08/2024 74  33 - 136 U/L Final   • ALT 08/08/2024 16  10 - 52 U/L Final   • AST 08/08/2024 19  9 - 39 U/L Final   • Total Protein 08/08/2024 6.6  6.4 - 8.2 g/dL Final   • Cholesterol 08/08/2024 185  0 - 199 mg/dL Final   • HDL-Cholesterol 08/08/2024 44.6  mg/dL Final   • Cholesterol/HDL Ratio 08/08/2024 4.1   Final   • LDL Calculated 08/08/2024 103 (H)  <=99 mg/dL Final   • VLDL 08/08/2024 37  0 - 40 mg/dL Final   • Triglycerides 08/08/2024 187 (H)  0 - 149 mg/dL Final   • Non HDL Cholesterol 08/08/2024 140  0 - 149 mg/dL Final   • Hemoglobin A1C 08/08/2024 6.5 (H)  see below % Final   • Estimated Average Glucose 08/08/2024 140  Not Established mg/dL Final   Lab on 07/19/2024   Component Date Value Ref Range Status   • WBC 07/19/2024 12.1 (H)  4.4 - 11.3 x10*3/uL Final   • nRBC 07/19/2024 0.0  0.0 - 0.0 /100 WBCs Final   • RBC 07/19/2024 5.54  4.50 - 5.90 x10*6/uL  Final   • Hemoglobin 07/19/2024 15.7  13.5 - 17.5 g/dL Final   • Hematocrit 07/19/2024 46.9  41.0 - 52.0 % Final   • MCV 07/19/2024 85  80 - 100 fL Final   • MCH 07/19/2024 28.3  26.0 - 34.0 pg Final   • MCHC 07/19/2024 33.5  32.0 - 36.0 g/dL Final   • RDW 07/19/2024 13.7  11.5 - 14.5 % Final   • Platelets 07/19/2024 316  150 - 450 x10*3/uL Final   • Neutrophils % 07/19/2024 76.9  40.0 - 80.0 % Final   • Immature Granulocytes %, Automated 07/19/2024 0.2  0.0 - 0.9 % Final   • Lymphocytes % 07/19/2024 12.4  13.0 - 44.0 % Final   • Monocytes % 07/19/2024 7.7  2.0 - 10.0 % Final   • Eosinophils % 07/19/2024 2.6  0.0 - 6.0 % Final   • Basophils % 07/19/2024 0.2  0.0 - 2.0 % Final   • Neutrophils Absolute 07/19/2024 9.33 (H)  1.20 - 7.70 x10*3/uL Final   • Immature Granulocytes Absolute, Au* 07/19/2024 0.03  0.00 - 0.70 x10*3/uL Final   • Lymphocytes Absolute 07/19/2024 1.50  1.20 - 4.80 x10*3/uL Final   • Monocytes Absolute 07/19/2024 0.94  0.10 - 1.00 x10*3/uL Final   • Eosinophils Absolute 07/19/2024 0.31  0.00 - 0.70 x10*3/uL Final   • Basophils Absolute 07/19/2024 0.03  0.00 - 0.10 x10*3/uL Final   • Glucose 07/19/2024 119 (H)  74 - 99 mg/dL Final   • Sodium 07/19/2024 138  136 - 145 mmol/L Final   • Potassium 07/19/2024 4.0  3.5 - 5.3 mmol/L Final   • Chloride 07/19/2024 103  98 - 107 mmol/L Final   • Bicarbonate 07/19/2024 26  21 - 32 mmol/L Final   • Anion Gap 07/19/2024 13  10 - 20 mmol/L Final   • Urea Nitrogen 07/19/2024 23  6 - 23 mg/dL Final   • Creatinine 07/19/2024 1.01  0.50 - 1.30 mg/dL Final   • eGFR 07/19/2024 83  >60 mL/min/1.73m*2 Final   • Calcium 07/19/2024 9.9  8.6 - 10.3 mg/dL Final   • Albumin 07/19/2024 4.1  3.4 - 5.0 g/dL Final   • Alkaline Phosphatase 07/19/2024 63  33 - 136 U/L Final   • Total Protein 07/19/2024 6.7  6.4 - 8.2 g/dL Final   • AST 07/19/2024 16  9 - 39 U/L Final   • Bilirubin, Total 07/19/2024 0.7  0.0 - 1.2 mg/dL Final   • ALT 07/19/2024 22  10 - 52 U/L Final   • Thyroid  Stimulating Hormone 07/19/2024 1.86  0.44 - 3.98 mIU/L Final   • Sedimentation Rate 07/19/2024 21 (H)  0 - 20 mm/h Final   • C-Reactive Protein 07/19/2024 0.73  <1.00 mg/dL Final         Assessment/Plan  Diagnoses and all orders for this visit:  Adverse effect of other drugs, medicaments and biological substances, initial encounter  Anaphylaxis, initial encounter  Comments:  he needs to see an allergist i told him so  Orders:  -     Referral to Allergy  Allergy to nonsteroidal anti-inflammatory drug (NSAID)    You had a strong reaction to the voltaren and we discussed the importance of avoiding nsaids in the future.     The exception is that celebrex is a jarvis-2 inhibitor so the majority of time, it is tolerated. Since you recently took this (May'24) without problems, I agree with you trying this again at home (but if you prefer, we can schedule this in my office, under supervision).    Baby aspirin - recommend desensitization protocol  This will overcome the allergy and since you had such a strong reaction to the voltaren, it is necessary that we perform this in my office.     Bring     Please bring [employer] CHEWABLE BABY ASPIRIN 81MG.  Of note, he is not on a beta blocker; administering montelukast is not necessary.        Mark Armstrong MD

## 2024-11-21 NOTE — PROGRESS NOTES
"Subjective   Patient ID:   [id number]   Ramirez Antonio \"Michael\" is a 64 y.o. male who presents for Allergic Reaction (Pt presents today for anaphylaxis reaction to Ibuprofen over the summer    ).    Chief Complaint   Patient presents with    Allergic Reaction     Pt presents today for anaphylaxis reaction to Ibuprofen over the summer              Allergic Reaction      This patient is here to evaluate for:  Michael is here with his wife, Esha  Sent by Dr. Brown    History of nsaid allergy.    Had a 3 week reaction from Voltaren  He accidentally had voltaren cream on left dorsal hand - within an hour, his whole face swelled  Used benadryl.  He did not want to go to the ED  Large round hives occurred, quarter sized; and they were pruritic.     Saw PCP and did medrol 6 taper - helped but then it recurred.  Hives on arms and stomach.     Lip tingling, then went to Urgent care - got a shot of epinephrine (and a scrip)  Prednisone, famotidine, and benadryl. The epinephrine cleared up his symptoms quickly and after the 5 days of steroids, it recurred.     Approx 20 years ago, had ibuprofen and had hives.   No hives since then.   Ok with tylenol and celebrex.  Likely had it in May '24 without problems, on as needed; not daily.     Had been on baby aspirin, per cardiologist.       Review of Systems   All other systems reviewed and are negative.        Objective     /83 (BP Location: Left arm, Patient Position: Sitting, BP Cuff Size: Adult)   Pulse 60   Wt 103 kg (228 lb)   BMI 32.71 kg/m²      Physical Exam  Constitutional:       General: He is not in acute distress.     Appearance: Normal appearance. He is not ill-appearing.   HENT:      Head: Normocephalic and atraumatic.      Right Ear: Tympanic membrane, ear canal and external ear normal.      Left Ear: Tympanic membrane, ear canal and external ear normal.      Nose: Nose normal. No congestion or rhinorrhea.      Mouth/Throat:      Mouth: Mucous membranes are " moist.      Pharynx: Oropharynx is clear. No oropharyngeal exudate or posterior oropharyngeal erythema.   Eyes:      General:         Right eye: No discharge.         Left eye: No discharge.      Conjunctiva/sclera: Conjunctivae normal.   Cardiovascular:      Rate and Rhythm: Normal rate and regular rhythm.      Heart sounds: Normal heart sounds. No murmur heard.     No friction rub. No gallop.   Pulmonary:      Effort: Pulmonary effort is normal. No respiratory distress.      Breath sounds: Normal breath sounds. No stridor. No wheezing, rhonchi or rales.   Chest:      Chest wall: No tenderness.   Abdominal:      General: Abdomen is flat.      Palpations: Abdomen is soft.   Musculoskeletal:         General: Normal range of motion.      Cervical back: Normal range of motion and neck supple.   Lymphadenopathy:      Cervical: No cervical adenopathy.   Skin:     General: Skin is warm and dry.      Findings: No erythema, lesion or rash.   Neurological:      General: No focal deficit present.      Mental Status: He is alert. Mental status is at baseline.   Psychiatric:         Mood and Affect: Mood normal.         Behavior: Behavior normal.         Thought Content: Thought content normal.         Judgment: Judgment normal.            Current Outpatient Medications   Medication Sig Dispense Refill    amLODIPine (Norvasc) 5 mg tablet Take 1 tablet by mouth once a day 90 days 90 tablet 3    atorvastatin (Lipitor) 20 mg tablet TAKE 1 TABLET BY MOUTH ONCE DAILY 90 tablet 1    atorvastatin (Lipitor) 40 mg tablet Take 1 tablet (40 mg) by mouth once daily. 90 tablet 3    celecoxib (CeleBREX) 200 mg capsule TAKE 1 CAPSULE BY MOUTH ONCE DAILY WITH FOOD 90 capsule 1    famotidine (Pepcid) 40 mg tablet TAKE 1 TABLET BY MOUTH 2 TIMES A  tablet 1    losartan (Cozaar) 100 mg tablet TAKE 1 TABLET BY MOUTH ONCE DAILY 90 tablet 2    omeprazole (PriLOSEC) 20 mg DR capsule Take 2 capsules (40 mg) by mouth once daily in the morning.  Take before meals. 90 capsule 1     No current facility-administered medications for this visit.       Summary of the labs over the past 6 months:    Lab on 08/08/2024   Component Date Value Ref Range Status    Prostate Specific AG 08/08/2024 1.50  <=4.00 ng/mL Final    Albumin 08/08/2024 4.2  3.4 - 5.0 g/dL Final    Bilirubin, Total 08/08/2024 0.5  0.0 - 1.2 mg/dL Final    Bilirubin, Direct 08/08/2024 0.1  0.0 - 0.3 mg/dL Final    Alkaline Phosphatase 08/08/2024 74  33 - 136 U/L Final    ALT 08/08/2024 16  10 - 52 U/L Final    AST 08/08/2024 19  9 - 39 U/L Final    Total Protein 08/08/2024 6.6  6.4 - 8.2 g/dL Final    Cholesterol 08/08/2024 185  0 - 199 mg/dL Final    HDL-Cholesterol 08/08/2024 44.6  mg/dL Final    Cholesterol/HDL Ratio 08/08/2024 4.1   Final    LDL Calculated 08/08/2024 103 (H)  <=99 mg/dL Final    VLDL 08/08/2024 37  0 - 40 mg/dL Final    Triglycerides 08/08/2024 187 (H)  0 - 149 mg/dL Final    Non HDL Cholesterol 08/08/2024 140  0 - 149 mg/dL Final    Hemoglobin A1C 08/08/2024 6.5 (H)  see below % Final    Estimated Average Glucose 08/08/2024 140  Not Established mg/dL Final   Lab on 07/19/2024   Component Date Value Ref Range Status    WBC 07/19/2024 12.1 (H)  4.4 - 11.3 x10*3/uL Final    nRBC 07/19/2024 0.0  0.0 - 0.0 /100 WBCs Final    RBC 07/19/2024 5.54  4.50 - 5.90 x10*6/uL Final    Hemoglobin 07/19/2024 15.7  13.5 - 17.5 g/dL Final    Hematocrit 07/19/2024 46.9  41.0 - 52.0 % Final    MCV 07/19/2024 85  80 - 100 fL Final    MCH 07/19/2024 28.3  26.0 - 34.0 pg Final    MCHC 07/19/2024 33.5  32.0 - 36.0 g/dL Final    RDW 07/19/2024 13.7  11.5 - 14.5 % Final    Platelets 07/19/2024 316  150 - 450 x10*3/uL Final    Neutrophils % 07/19/2024 76.9  40.0 - 80.0 % Final    Immature Granulocytes %, Automated 07/19/2024 0.2  0.0 - 0.9 % Final    Lymphocytes % 07/19/2024 12.4  13.0 - 44.0 % Final    Monocytes % 07/19/2024 7.7  2.0 - 10.0 % Final    Eosinophils % 07/19/2024 2.6  0.0 - 6.0 % Final     Basophils % 07/19/2024 0.2  0.0 - 2.0 % Final    Neutrophils Absolute 07/19/2024 9.33 (H)  1.20 - 7.70 x10*3/uL Final    Immature Granulocytes Absolute, Au* 07/19/2024 0.03  0.00 - 0.70 x10*3/uL Final    Lymphocytes Absolute 07/19/2024 1.50  1.20 - 4.80 x10*3/uL Final    Monocytes Absolute 07/19/2024 0.94  0.10 - 1.00 x10*3/uL Final    Eosinophils Absolute 07/19/2024 0.31  0.00 - 0.70 x10*3/uL Final    Basophils Absolute 07/19/2024 0.03  0.00 - 0.10 x10*3/uL Final    Glucose 07/19/2024 119 (H)  74 - 99 mg/dL Final    Sodium 07/19/2024 138  136 - 145 mmol/L Final    Potassium 07/19/2024 4.0  3.5 - 5.3 mmol/L Final    Chloride 07/19/2024 103  98 - 107 mmol/L Final    Bicarbonate 07/19/2024 26  21 - 32 mmol/L Final    Anion Gap 07/19/2024 13  10 - 20 mmol/L Final    Urea Nitrogen 07/19/2024 23  6 - 23 mg/dL Final    Creatinine 07/19/2024 1.01  0.50 - 1.30 mg/dL Final    eGFR 07/19/2024 83  >60 mL/min/1.73m*2 Final    Calcium 07/19/2024 9.9  8.6 - 10.3 mg/dL Final    Albumin 07/19/2024 4.1  3.4 - 5.0 g/dL Final    Alkaline Phosphatase 07/19/2024 63  33 - 136 U/L Final    Total Protein 07/19/2024 6.7  6.4 - 8.2 g/dL Final    AST 07/19/2024 16  9 - 39 U/L Final    Bilirubin, Total 07/19/2024 0.7  0.0 - 1.2 mg/dL Final    ALT 07/19/2024 22  10 - 52 U/L Final    Thyroid Stimulating Hormone 07/19/2024 1.86  0.44 - 3.98 mIU/L Final    Sedimentation Rate 07/19/2024 21 (H)  0 - 20 mm/h Final    C-Reactive Protein 07/19/2024 0.73  <1.00 mg/dL Final         Assessment/Plan   Diagnoses and all orders for this visit:  Adverse effect of other drugs, medicaments and biological substances, initial encounter  Anaphylaxis, initial encounter  Comments:  he needs to see an allergist i told him so  Orders:  -     Referral to Allergy  Allergy to nonsteroidal anti-inflammatory drug (NSAID)    You had a strong reaction to the voltaren and we discussed the importance of avoiding nsaids in the future.     The exception is that celebrex is a  jarvis-2 inhibitor so the majority of time, it is tolerated. Since you recently took this (May'24) without problems, I agree with you trying this again at home (but if you prefer, we can schedule this in my office, under supervision).    Baby aspirin - recommend desensitization protocol  This will overcome the allergy and since you had such a strong reaction to the voltaren, it is necessary that we perform this in my office.     Bring     Please bring [employer] CHEWABLE BABY ASPIRIN 81MG.  Of note, he is not on a beta blocker; administering montelukast is not necessary.        Mark Armstrong MD

## 2025-01-10 ENCOUNTER — PHARMACY VISIT (OUTPATIENT)
Dept: PHARMACY | Facility: CLINIC | Age: 65
End: 2025-01-10
Payer: COMMERCIAL

## 2025-01-10 PROCEDURE — RXMED WILLOW AMBULATORY MEDICATION CHARGE

## 2025-02-06 ENCOUNTER — APPOINTMENT (OUTPATIENT)
Dept: ALLERGY | Facility: CLINIC | Age: 65
End: 2025-02-06
Payer: COMMERCIAL

## 2025-02-07 ENCOUNTER — APPOINTMENT (OUTPATIENT)
Dept: PRIMARY CARE | Facility: CLINIC | Age: 65
End: 2025-02-07
Payer: COMMERCIAL

## 2025-02-07 VITALS
HEART RATE: 56 BPM | BODY MASS INDEX: 32.07 KG/M2 | DIASTOLIC BLOOD PRESSURE: 78 MMHG | HEIGHT: 70 IN | WEIGHT: 224 LBS | SYSTOLIC BLOOD PRESSURE: 128 MMHG | OXYGEN SATURATION: 98 %

## 2025-02-07 DIAGNOSIS — Z12.5 PROSTATE CANCER SCREENING: ICD-10-CM

## 2025-02-07 DIAGNOSIS — Z00.00 WELLNESS EXAMINATION: Primary | ICD-10-CM

## 2025-02-07 DIAGNOSIS — T78.2XXS ANAPHYLAXIS, SEQUELA: ICD-10-CM

## 2025-02-07 DIAGNOSIS — T78.40XS ALLERGY, SEQUELA: ICD-10-CM

## 2025-02-07 DIAGNOSIS — N52.9 ERECTILE DYSFUNCTION, UNSPECIFIED ERECTILE DYSFUNCTION TYPE: ICD-10-CM

## 2025-02-07 DIAGNOSIS — R73.02 IGT (IMPAIRED GLUCOSE TOLERANCE): ICD-10-CM

## 2025-02-07 DIAGNOSIS — I10 HYPERTENSION, UNSPECIFIED TYPE: ICD-10-CM

## 2025-02-07 DIAGNOSIS — M54.42 ACUTE LEFT-SIDED LOW BACK PAIN WITH LEFT-SIDED SCIATICA: ICD-10-CM

## 2025-02-07 DIAGNOSIS — K21.9 GASTROESOPHAGEAL REFLUX DISEASE WITHOUT ESOPHAGITIS: ICD-10-CM

## 2025-02-07 DIAGNOSIS — R93.1 HIGH CORONARY ARTERY CALCIUM SCORE: ICD-10-CM

## 2025-02-07 DIAGNOSIS — I25.10 CORONARY ARTERY DISEASE DUE TO CALCIFIED CORONARY LESION: ICD-10-CM

## 2025-02-07 DIAGNOSIS — I25.84 CORONARY ARTERY DISEASE DUE TO CALCIFIED CORONARY LESION: ICD-10-CM

## 2025-02-07 DIAGNOSIS — E78.2 MIXED HYPERLIPIDEMIA: ICD-10-CM

## 2025-02-07 DIAGNOSIS — I10 PRIMARY HYPERTENSION: ICD-10-CM

## 2025-02-07 LAB — POC FECAL OCCULT BLOOD IMMUNOASSAY: NEGATIVE

## 2025-02-07 PROCEDURE — 3008F BODY MASS INDEX DOCD: CPT | Performed by: INTERNAL MEDICINE

## 2025-02-07 PROCEDURE — 99396 PREV VISIT EST AGE 40-64: CPT | Performed by: INTERNAL MEDICINE

## 2025-02-07 PROCEDURE — 3074F SYST BP LT 130 MM HG: CPT | Performed by: INTERNAL MEDICINE

## 2025-02-07 PROCEDURE — RXMED WILLOW AMBULATORY MEDICATION CHARGE

## 2025-02-07 PROCEDURE — 3078F DIAST BP <80 MM HG: CPT | Performed by: INTERNAL MEDICINE

## 2025-02-07 PROCEDURE — 82274 ASSAY TEST FOR BLOOD FECAL: CPT | Performed by: INTERNAL MEDICINE

## 2025-02-07 RX ORDER — CELECOXIB 200 MG/1
200 CAPSULE ORAL
Qty: 90 CAPSULE | Refills: 1 | Status: SHIPPED | OUTPATIENT
Start: 2025-02-07 | End: 2026-02-07

## 2025-02-07 RX ORDER — TIZANIDINE 2 MG/1
2 TABLET ORAL EVERY 8 HOURS PRN
Qty: 30 TABLET | Refills: 0 | Status: SHIPPED | OUTPATIENT
Start: 2025-02-07 | End: 2025-02-18

## 2025-02-07 RX ORDER — OMEPRAZOLE 20 MG/1
40 CAPSULE, DELAYED RELEASE ORAL
Qty: 90 CAPSULE | Refills: 1 | Status: SHIPPED | OUTPATIENT
Start: 2025-02-07

## 2025-02-07 RX ORDER — ATORVASTATIN CALCIUM 40 MG/1
40 TABLET, FILM COATED ORAL DAILY
Qty: 90 TABLET | Refills: 3 | Status: SHIPPED | OUTPATIENT
Start: 2025-02-07

## 2025-02-07 RX ORDER — AMLODIPINE BESYLATE 5 MG/1
TABLET ORAL
Qty: 90 TABLET | Refills: 3 | Status: SHIPPED | OUTPATIENT
Start: 2025-02-07

## 2025-02-07 RX ORDER — LOSARTAN POTASSIUM 100 MG/1
100 TABLET ORAL DAILY
Qty: 90 TABLET | Refills: 2 | Status: SHIPPED | OUTPATIENT
Start: 2025-02-07 | End: 2026-02-07

## 2025-02-07 RX ORDER — METHYLPREDNISOLONE 4 MG/1
TABLET ORAL
Qty: 21 TABLET | Refills: 0 | Status: SHIPPED | OUTPATIENT
Start: 2025-02-07 | End: 2025-02-14

## 2025-02-07 ASSESSMENT — ENCOUNTER SYMPTOMS
DIAPHORESIS: 0
FATIGUE: 0
PSYCHIATRIC NEGATIVE: 1
ENDOCRINE NEGATIVE: 1
HEADACHES: 0
APPETITE CHANGE: 0
EYE DISCHARGE: 0
CHILLS: 0
ALLERGIC/IMMUNOLOGIC NEGATIVE: 1
ACTIVITY CHANGE: 0
SHORTNESS OF BREATH: 0
CHOKING: 0
EYE ITCHING: 0
COUGH: 0
LIGHT-HEADEDNESS: 0
EYE PAIN: 0
PALPITATIONS: 0
GASTROINTESTINAL NEGATIVE: 1
PHOTOPHOBIA: 0
FEVER: 0
STRIDOR: 0
ARTHRALGIAS: 0
NUMBNESS: 0
DIZZINESS: 0
HEMATOLOGIC/LYMPHATIC NEGATIVE: 1
CHEST TIGHTNESS: 0
UNEXPECTED WEIGHT CHANGE: 0
FACIAL ASYMMETRY: 0
EYE REDNESS: 0
WHEEZING: 0
APNEA: 0

## 2025-02-07 NOTE — PROGRESS NOTES
Subjective   Patient ID: Michael Antonio is a 64 y.o. male who presents for Annual Exam.    HPI   Htn   High chol  Elevated psa     Fmhx  D d 92 dm  M a alz    3 bro al ok    Colonoscopy up to datre     Active Problems  Problems  Problem Classification Problem Date Documented Date Episodic/Chronic                                                                                                   Allergic reactions  (3 sources) Allergy status to analgesic agent status; Translations: [Urticaria, unspecified] Onset: 07-   Episodic                                                                                                   Conditions associated with dizziness or vertigo  (2 sources) Dizziness and giddiness; Translations: [DIZZINESS AND GIDDINESS] Onset: 09-   Episodic                                                                                                   Coronary atherosclerosis and other heart disease  (2 sources) Atherosclerotic heart disease of native coronary artery without angina pectoris; Translations: [ASHD NATIVE CA W/O ANGINA PECTORIS] Onset: 10-   Chronic                                                                                                   Diabetes mellitus without complication  (6 sources) Prediabetes; Translations: [Other abnormal glucose]     Episodic                                                                                                   Disorders of lipid metabolism  (6 sources) Hyperlipidemia; Translations: [Other and unspecified hyperlipidemia]     Chronic                                                                                                   Esophageal disorders  (7 sources) Gastroesophageal reflux disease; Translations: [Esophageal reflux] Onset: 12-   Chronic                                                                                                   Essential hypertension  (7 sources) Hypertensive disorder; Translations:  [Unspecified essential hypertension] Onset: 12-   Chronic                                                                                                   Heart valve disorders  (1 source) Nonrheumatic aortic (valve) insufficiency; Translations: [NONRHEUMATIC AORTIC INSUFFICIENCY] Onset: 10-   Chronic                                                                                                   Malaise and fatigue  (6 sources) Fatigue; Translations: [Other malaise and fatigue]     Episodic                                                                                                   Nutritional deficiencies  (6 sources) Vitamin D deficiency; Translations: [Unspecified vitamin D deficiency]     Chronic                                                                                                   Occlusion or stenosis of precerebral arteries  (2 sources) Occlusion and stenosis of right carotid artery; Translations: [Occlusion and stenosis of bilateral carotid arteries] Onset: 10-   Chronic                                                                                                   Other aftercare  (1 source) Other long term (current) drug therapy; Translations: [OTH LONG TERM CURRENT DRUG THERAPY] Onset: 09-   Episodic                                                                                                   Other connective tissue disease  (5 sources) Calcific tendinitis of left shoulder; Translations: [Calcifying tendinitis of shoulder]     Episodic                                                                                                   Other connective tissue disease  (5 sources) Rotator cuff impingement syndrome; Translations: [Disorders of bursae and tendons in shoulder region, unspecified]     Episodic                                                                                                   Other male genital disorders  (6 sources) Male erectile  dysfunction, unspecified; Translations: [Erectile dysfunction]     Chronic                                                                                                   Other non-traumatic joint disorders  (6 sources) Shoulder pain; Translations: [Pain in joint, shoulder region]     Episodic                                                                                                   Otitis media and related conditions  (1 source) Acute suppurative otitis media without spontaneous rupture of ear drum, left ear; Translations: [AC SUPPRTV OM W/O RUPT EAR DRUM LT] Onset: 09-   Episodic                                                                                                   Residual codes; unclassified  (6 sources) Obstructive sleep apnea syndrome; Translations: [Obstructive sleep apnea (adult)(pediatric)]     Chronic                                                                                                   Residual codes; unclassified  (1 source) Obstructive sleep apnea (adult) (pediatric); Translations: [OBSTRUCTIVE SLEEP APNEA] Onset: 12-   Chronic                                                                                                   Sprains and strains  (5 sources) Strain of rotator cuff of shoulder; Translations: [Rotator cuff (capsule) sprain]     Episodic                                                                                                   Unclassified  (2 sources) LEFT SHOULDER INJURY   11-                                                                                                     Comment on above: LEFT SHOULDER INJURY     Allergy Classification Reported Allergen(s) Allergy Type Date of Onset Reaction(s) Facility     (1 source) NSAIDs     Hives/Urticaria [facility]     (6 sources) Ibuprofen; Translations: [ibuprofen] Drug Allergy   Hives RE-Llbuwjmgjtvr-Dbqqrbhwxqq 1st Flr B DO  Work Phone: [phone]     (1 source)  "NSAIDs Drug allergy (disorder)        Review of Systems   Constitutional:  Negative for activity change, appetite change, chills, diaphoresis, fatigue, fever and unexpected weight change.   HENT: Negative.     Eyes:  Negative for photophobia, pain, discharge, redness, itching and visual disturbance.   Respiratory:  Negative for apnea, cough, choking, chest tightness, shortness of breath, wheezing and stridor.    Cardiovascular:  Negative for chest pain, palpitations and leg swelling.   Gastrointestinal: Negative.    Endocrine: Negative.    Genitourinary: Negative.    Musculoskeletal:  Negative for arthralgias.   Skin: Negative.    Allergic/Immunologic: Negative.    Neurological:  Negative for dizziness, facial asymmetry, light-headedness, numbness and headaches.   Hematological: Negative.    Psychiatric/Behavioral: Negative.         Objective   /78 (BP Location: Right arm, Patient Position: Sitting, BP Cuff Size: Adult)   Pulse 56   Ht 1.778 m (5' 10\")   Wt 102 kg (224 lb)   SpO2 98%   BMI 32.14 kg/m²     Physical Exam  Constitutional:       Appearance: Normal appearance.   HENT:      Head: Normocephalic and atraumatic.      Right Ear: Tympanic membrane normal.      Left Ear: Tympanic membrane normal.      Nose: Nose normal.   Eyes:      Extraocular Movements: Extraocular movements intact.      Conjunctiva/sclera: Conjunctivae normal.      Pupils: Pupils are equal, round, and reactive to light.   Cardiovascular:      Rate and Rhythm: Normal rate and regular rhythm.      Pulses: Normal pulses.      Heart sounds: Normal heart sounds.   Pulmonary:      Effort: Pulmonary effort is normal.      Breath sounds: Normal breath sounds.   Abdominal:      General: Abdomen is flat. Bowel sounds are normal.      Palpations: Abdomen is soft.   Musculoskeletal:         General: Normal range of motion.      Cervical back: Normal range of motion and neck supple.   Skin:     General: Skin is warm and dry.   Neurological:    "   General: No focal deficit present.      Mental Status: He is oriented to person, place, and time. Mental status is at baseline.   Psychiatric:         Mood and Affect: Mood normal.         Behavior: Behavior normal.         Thought Content: Thought content normal.         Judgment: Judgment normal.       Assessment/Plan   Diagnoses and all orders for this visit:  Wellness examination  -     Hemoglobin A1C; Future  -     atorvastatin (Lipitor) 40 mg tablet; Take 1 tablet (40 mg) by mouth once daily.  -     amLODIPine (Norvasc) 5 mg tablet; Take 1 tablet by mouth once a day 90 days  Coronary artery disease due to calcified coronary lesion  -     Hemoglobin A1C; Future  -     atorvastatin (Lipitor) 40 mg tablet; Take 1 tablet (40 mg) by mouth once daily.  -     amLODIPine (Norvasc) 5 mg tablet; Take 1 tablet by mouth once a day 90 days  Mixed hyperlipidemia  -     Hemoglobin A1C; Future  -     losartan (Cozaar) 100 mg tablet; TAKE 1 TABLET BY MOUTH ONCE DAILY  -     atorvastatin (Lipitor) 40 mg tablet; Take 1 tablet (40 mg) by mouth once daily.  -     amLODIPine (Norvasc) 5 mg tablet; Take 1 tablet by mouth once a day 90 days  -     omeprazole (PriLOSEC) 20 mg DR capsule; Take 2 capsules (40 mg) by mouth once daily in the morning. Take before meals.  -     celecoxib (CeleBREX) 200 mg capsule; TAKE 1 CAPSULE BY MOUTH ONCE DAILY WITH FOOD  Gastroesophageal reflux disease without esophagitis  -     Hemoglobin A1C; Future  -     losartan (Cozaar) 100 mg tablet; TAKE 1 TABLET BY MOUTH ONCE DAILY  -     atorvastatin (Lipitor) 40 mg tablet; Take 1 tablet (40 mg) by mouth once daily.  -     amLODIPine (Norvasc) 5 mg tablet; Take 1 tablet by mouth once a day 90 days  -     omeprazole (PriLOSEC) 20 mg DR capsule; Take 2 capsules (40 mg) by mouth once daily in the morning. Take before meals.  High coronary artery calcium score  -     Hemoglobin A1C; Future  -     atorvastatin (Lipitor) 40 mg tablet; Take 1 tablet (40 mg) by  mouth once daily.  -     amLODIPine (Norvasc) 5 mg tablet; Take 1 tablet by mouth once a day 90 days  Anaphylaxis, sequela  -     Hemoglobin A1C; Future  -     atorvastatin (Lipitor) 40 mg tablet; Take 1 tablet (40 mg) by mouth once daily.  -     amLODIPine (Norvasc) 5 mg tablet; Take 1 tablet by mouth once a day 90 days  Primary hypertension  -     Hemoglobin A1C; Future  -     atorvastatin (Lipitor) 40 mg tablet; Take 1 tablet (40 mg) by mouth once daily.  -     amLODIPine (Norvasc) 5 mg tablet; Take 1 tablet by mouth once a day 90 days  Allergy, sequela  -     Hemoglobin A1C; Future  -     atorvastatin (Lipitor) 40 mg tablet; Take 1 tablet (40 mg) by mouth once daily.  -     amLODIPine (Norvasc) 5 mg tablet; Take 1 tablet by mouth once a day 90 days  Erectile dysfunction, unspecified erectile dysfunction type  -     Hemoglobin A1C; Future  -     atorvastatin (Lipitor) 40 mg tablet; Take 1 tablet (40 mg) by mouth once daily.  -     amLODIPine (Norvasc) 5 mg tablet; Take 1 tablet by mouth once a day 90 days  IGT (impaired glucose tolerance)  -     Hemoglobin A1C; Future  -     atorvastatin (Lipitor) 40 mg tablet; Take 1 tablet (40 mg) by mouth once daily.  -     amLODIPine (Norvasc) 5 mg tablet; Take 1 tablet by mouth once a day 90 days  Hypertension, unspecified type  -     Hemoglobin A1C; Future  -     losartan (Cozaar) 100 mg tablet; TAKE 1 TABLET BY MOUTH ONCE DAILY  -     atorvastatin (Lipitor) 40 mg tablet; Take 1 tablet (40 mg) by mouth once daily.  -     amLODIPine (Norvasc) 5 mg tablet; Take 1 tablet by mouth once a day 90 days  -     omeprazole (PriLOSEC) 20 mg DR capsule; Take 2 capsules (40 mg) by mouth once daily in the morning. Take before meals.  Other orders  -     Follow Up In Primary Care - Established

## 2025-02-08 ENCOUNTER — PHARMACY VISIT (OUTPATIENT)
Dept: PHARMACY | Facility: CLINIC | Age: 65
End: 2025-02-08
Payer: COMMERCIAL

## 2025-02-08 LAB
ALBUMIN SERPL-MCNC: 4.5 G/DL (ref 3.6–5.1)
ALP SERPL-CCNC: 63 U/L (ref 35–144)
ALT SERPL-CCNC: 17 U/L (ref 9–46)
ANION GAP SERPL CALCULATED.4IONS-SCNC: 9 MMOL/L (CALC) (ref 7–17)
APPEARANCE UR: CLEAR
AST SERPL-CCNC: 17 U/L (ref 10–35)
BACTERIA #/AREA URNS HPF: NORMAL /HPF
BACTERIA UR CULT: NORMAL
BILIRUB SERPL-MCNC: 0.7 MG/DL (ref 0.2–1.2)
BILIRUB UR QL STRIP: NEGATIVE
BUN SERPL-MCNC: 22 MG/DL (ref 7–25)
CALCIUM SERPL-MCNC: 9.5 MG/DL (ref 8.6–10.3)
CHLORIDE SERPL-SCNC: 107 MMOL/L (ref 98–110)
CHOLEST SERPL-MCNC: 172 MG/DL
CHOLEST/HDLC SERPL: 3.2 (CALC)
CO2 SERPL-SCNC: 25 MMOL/L (ref 20–32)
COLOR UR: YELLOW
CREAT SERPL-MCNC: 0.97 MG/DL (ref 0.7–1.35)
EGFRCR SERPLBLD CKD-EPI 2021: 87 ML/MIN/1.73M2
ERYTHROCYTE [DISTWIDTH] IN BLOOD BY AUTOMATED COUNT: 13.4 % (ref 11–15)
EST. AVERAGE GLUCOSE BLD GHB EST-MCNC: 128 MG/DL
EST. AVERAGE GLUCOSE BLD GHB EST-SCNC: 7.1 MMOL/L
GLUCOSE SERPL-MCNC: 105 MG/DL (ref 65–99)
GLUCOSE UR QL STRIP: NEGATIVE
HBA1C MFR BLD: 6.1 % OF TOTAL HGB
HCT VFR BLD AUTO: 45.2 % (ref 38.5–50)
HDLC SERPL-MCNC: 54 MG/DL
HGB BLD-MCNC: 15.2 G/DL (ref 13.2–17.1)
HGB UR QL STRIP: NEGATIVE
HYALINE CASTS #/AREA URNS LPF: NORMAL /LPF
KETONES UR QL STRIP: NEGATIVE
LDLC SERPL CALC-MCNC: 100 MG/DL (CALC)
LEUKOCYTE ESTERASE UR QL STRIP: NEGATIVE
MCH RBC QN AUTO: 28.9 PG (ref 27–33)
MCHC RBC AUTO-ENTMCNC: 33.6 G/DL (ref 32–36)
MCV RBC AUTO: 85.9 FL (ref 80–100)
NITRITE UR QL STRIP: NEGATIVE
NONHDLC SERPL-MCNC: 118 MG/DL (CALC)
PH UR STRIP: 6 [PH] (ref 5–8)
PLATELET # BLD AUTO: 308 THOUSAND/UL (ref 140–400)
PMV BLD REES-ECKER: 9.8 FL (ref 7.5–12.5)
POTASSIUM SERPL-SCNC: 4.3 MMOL/L (ref 3.5–5.3)
PROT SERPL-MCNC: 6.8 G/DL (ref 6.1–8.1)
PROT UR QL STRIP: NEGATIVE
PSA SERPL-MCNC: 1.13 NG/ML
RBC # BLD AUTO: 5.26 MILLION/UL (ref 4.2–5.8)
RBC #/AREA URNS HPF: NORMAL /HPF
SERVICE CMNT-IMP: NORMAL
SODIUM SERPL-SCNC: 141 MMOL/L (ref 135–146)
SP GR UR STRIP: 1.02 (ref 1–1.03)
SQUAMOUS #/AREA URNS HPF: NORMAL /HPF
TRIGL SERPL-MCNC: 89 MG/DL
TSH SERPL-ACNC: 2.11 MIU/L (ref 0.4–4.5)
WBC # BLD AUTO: 8 THOUSAND/UL (ref 3.8–10.8)
WBC #/AREA URNS HPF: NORMAL /HPF

## 2025-02-08 PROCEDURE — RXOTC WILLOW AMBULATORY OTC CHARGE

## 2025-02-11 ENCOUNTER — TELEPHONE (OUTPATIENT)
Dept: PRIMARY CARE | Facility: CLINIC | Age: 65
End: 2025-02-11
Payer: COMMERCIAL

## 2025-02-11 DIAGNOSIS — N52.9 ERECTILE DYSFUNCTION, UNSPECIFIED ERECTILE DYSFUNCTION TYPE: ICD-10-CM

## 2025-02-11 NOTE — TELEPHONE ENCOUNTER
Problem: Pain  Goal: Acceptable pain level achieved/maintained at rest using appropriate pain scale for the patient  Outcome: Monitoring/Evaluating progress  Goal: Acceptable pain level achieved/maintained with activity using appropriate pain scale for the patient  Outcome: Monitoring/Evaluating progress  Goal: Acceptable pain level achieved/maintained without oversedation  Outcome: Monitoring/Evaluating progress     Problem: At Risk for Falls  Goal: Patient does not fall  Outcome: Monitoring/Evaluating progress  Goal: Patient takes action to control fall-related risks  Outcome: Monitoring/Evaluating progress     Problem: At Risk for Injury Due to Fall  Goal: Patient does not fall  Outcome: Monitoring/Evaluating progress  Goal: Takes action to control condition specific risks  Outcome: Monitoring/Evaluating progress  Goal: Verbalizes understanding of fall-related injury personal risks  Description: Document education using the patient education activity  Outcome: Monitoring/Evaluating progress      Yesterday, patient left voicemail stating he did not see his Testosterone results.  I called and informed him, it was not added to his lab order.  Per Dr. Brown:  Ok to add the order to blood drawn on 02/07/25.    Called [facility] and needs a different tube collection.  Patient notifed order is placed and he will need to get it drawn.  He will go to [facility] to.

## 2025-02-19 DIAGNOSIS — I10 PRIMARY HYPERTENSION: ICD-10-CM

## 2025-02-19 DIAGNOSIS — T78.2XXS ANAPHYLAXIS, SEQUELA: ICD-10-CM

## 2025-02-19 DIAGNOSIS — T78.40XS ALLERGY, SEQUELA: ICD-10-CM

## 2025-02-19 DIAGNOSIS — R73.02 IGT (IMPAIRED GLUCOSE TOLERANCE): ICD-10-CM

## 2025-02-19 DIAGNOSIS — Z00.00 WELLNESS EXAMINATION: ICD-10-CM

## 2025-02-19 DIAGNOSIS — I25.10 CORONARY ARTERY DISEASE DUE TO CALCIFIED CORONARY LESION: ICD-10-CM

## 2025-02-19 DIAGNOSIS — K21.9 GASTROESOPHAGEAL REFLUX DISEASE WITHOUT ESOPHAGITIS: ICD-10-CM

## 2025-02-19 DIAGNOSIS — M54.42 ACUTE LEFT-SIDED LOW BACK PAIN WITH LEFT-SIDED SCIATICA: ICD-10-CM

## 2025-02-19 DIAGNOSIS — I25.84 CORONARY ARTERY DISEASE DUE TO CALCIFIED CORONARY LESION: ICD-10-CM

## 2025-02-19 DIAGNOSIS — N52.9 ERECTILE DYSFUNCTION, UNSPECIFIED ERECTILE DYSFUNCTION TYPE: ICD-10-CM

## 2025-02-19 DIAGNOSIS — I10 HYPERTENSION, UNSPECIFIED TYPE: ICD-10-CM

## 2025-02-19 DIAGNOSIS — Z12.5 PROSTATE CANCER SCREENING: ICD-10-CM

## 2025-02-19 DIAGNOSIS — E78.2 MIXED HYPERLIPIDEMIA: ICD-10-CM

## 2025-02-19 DIAGNOSIS — R93.1 HIGH CORONARY ARTERY CALCIUM SCORE: ICD-10-CM

## 2025-02-19 PROCEDURE — RXMED WILLOW AMBULATORY MEDICATION CHARGE

## 2025-02-19 RX ORDER — TIZANIDINE 2 MG/1
2 TABLET ORAL EVERY 8 HOURS PRN
Qty: 30 TABLET | Refills: 0 | OUTPATIENT
Start: 2025-02-19 | End: 2025-03-01

## 2025-02-20 ENCOUNTER — PHARMACY VISIT (OUTPATIENT)
Dept: PHARMACY | Facility: CLINIC | Age: 65
End: 2025-02-20
Payer: COMMERCIAL

## 2025-02-20 LAB
TESTOST FREE SERPL-MCNC: 60 PG/ML (ref 35–155)
TESTOST SERPL-MCNC: 339 NG/DL (ref 250–1100)

## 2025-03-06 ENCOUNTER — APPOINTMENT (OUTPATIENT)
Dept: UROLOGY | Facility: CLINIC | Age: 65
End: 2025-03-06
Payer: COMMERCIAL

## 2025-04-07 PROCEDURE — RXMED WILLOW AMBULATORY MEDICATION CHARGE

## 2025-04-11 ENCOUNTER — PHARMACY VISIT (OUTPATIENT)
Dept: PHARMACY | Facility: CLINIC | Age: 65
End: 2025-04-11
Payer: COMMERCIAL

## 2025-08-11 ENCOUNTER — APPOINTMENT (OUTPATIENT)
Dept: PRIMARY CARE | Facility: CLINIC | Age: 65
End: 2025-08-11
Payer: COMMERCIAL

## 2025-08-11 VITALS
BODY MASS INDEX: 31.92 KG/M2 | SYSTOLIC BLOOD PRESSURE: 136 MMHG | HEIGHT: 70 IN | WEIGHT: 223 LBS | DIASTOLIC BLOOD PRESSURE: 72 MMHG | HEART RATE: 55 BPM | OXYGEN SATURATION: 97 % | RESPIRATION RATE: 18 BRPM | TEMPERATURE: 97.3 F

## 2025-08-11 DIAGNOSIS — R73.02 IGT (IMPAIRED GLUCOSE TOLERANCE): ICD-10-CM

## 2025-08-11 DIAGNOSIS — R93.1 HIGH CORONARY ARTERY CALCIUM SCORE: ICD-10-CM

## 2025-08-11 DIAGNOSIS — E78.2 MIXED HYPERLIPIDEMIA: ICD-10-CM

## 2025-08-11 DIAGNOSIS — N52.9 ERECTILE DYSFUNCTION, UNSPECIFIED ERECTILE DYSFUNCTION TYPE: ICD-10-CM

## 2025-08-11 DIAGNOSIS — K21.9 GASTROESOPHAGEAL REFLUX DISEASE WITHOUT ESOPHAGITIS: ICD-10-CM

## 2025-08-11 DIAGNOSIS — I25.84 CORONARY ARTERY DISEASE DUE TO CALCIFIED CORONARY LESION: ICD-10-CM

## 2025-08-11 DIAGNOSIS — I25.10 CORONARY ARTERY DISEASE DUE TO CALCIFIED CORONARY LESION: ICD-10-CM

## 2025-08-11 DIAGNOSIS — I10 HYPERTENSION, UNSPECIFIED TYPE: ICD-10-CM

## 2025-08-11 LAB
ALBUMIN SERPL-MCNC: 4.5 G/DL (ref 3.6–5.1)
ALBUMIN/GLOB SERPL: 1.9 (CALC) (ref 1–2.5)
ALP SERPL-CCNC: 70 U/L (ref 35–144)
ALT SERPL-CCNC: 16 U/L (ref 9–46)
AST SERPL-CCNC: 17 U/L (ref 10–35)
BILIRUB DIRECT SERPL-MCNC: 0.1 MG/DL
BILIRUB INDIRECT SERPL-MCNC: 0.4 MG/DL (CALC) (ref 0.2–1.2)
BILIRUB SERPL-MCNC: 0.5 MG/DL (ref 0.2–1.2)
CHOLEST SERPL-MCNC: 155 MG/DL
CHOLEST/HDLC SERPL: 3.4 (CALC)
GLOBULIN SER CALC-MCNC: 2.4 G/DL (CALC) (ref 1.9–3.7)
HDLC SERPL-MCNC: 46 MG/DL
LDLC SERPL CALC-MCNC: 84 MG/DL (CALC)
NONHDLC SERPL-MCNC: 109 MG/DL (CALC)
POC HEMOGLOBIN A1C: 6.5 % (ref 4.2–6.5)
PROT SERPL-MCNC: 6.9 G/DL (ref 6.1–8.1)
TRIGL SERPL-MCNC: 151 MG/DL

## 2025-08-11 PROCEDURE — 3078F DIAST BP <80 MM HG: CPT | Performed by: INTERNAL MEDICINE

## 2025-08-11 PROCEDURE — 3075F SYST BP GE 130 - 139MM HG: CPT | Performed by: INTERNAL MEDICINE

## 2025-08-11 PROCEDURE — 3008F BODY MASS INDEX DOCD: CPT | Performed by: INTERNAL MEDICINE

## 2025-08-11 PROCEDURE — 1160F RVW MEDS BY RX/DR IN RCRD: CPT | Performed by: INTERNAL MEDICINE

## 2025-08-11 PROCEDURE — G2211 COMPLEX E/M VISIT ADD ON: HCPCS | Performed by: INTERNAL MEDICINE

## 2025-08-11 PROCEDURE — 1159F MED LIST DOCD IN RCRD: CPT | Performed by: INTERNAL MEDICINE

## 2025-08-11 PROCEDURE — 83036 HEMOGLOBIN GLYCOSYLATED A1C: CPT | Performed by: INTERNAL MEDICINE

## 2025-08-11 PROCEDURE — 99213 OFFICE O/P EST LOW 20 MIN: CPT | Performed by: INTERNAL MEDICINE

## 2025-08-11 RX ORDER — ATORVASTATIN CALCIUM 40 MG/1
40 TABLET, FILM COATED ORAL DAILY
Qty: 90 TABLET | Refills: 3 | Status: SHIPPED | OUTPATIENT
Start: 2025-08-11

## 2025-08-11 RX ORDER — LOSARTAN POTASSIUM 100 MG/1
100 TABLET ORAL DAILY
Qty: 90 TABLET | Refills: 2 | Status: SHIPPED | OUTPATIENT
Start: 2025-08-11 | End: 2026-08-11

## 2025-08-11 RX ORDER — OMEPRAZOLE 20 MG/1
20 CAPSULE, DELAYED RELEASE ORAL
Qty: 90 CAPSULE | Refills: 1 | Status: SHIPPED | OUTPATIENT
Start: 2025-08-11

## 2025-08-11 RX ORDER — AMLODIPINE BESYLATE 5 MG/1
TABLET ORAL
Qty: 90 TABLET | Refills: 3 | Status: SHIPPED | OUTPATIENT
Start: 2025-08-11

## 2025-08-11 ASSESSMENT — PATIENT HEALTH QUESTIONNAIRE - PHQ9
SUM OF ALL RESPONSES TO PHQ9 QUESTIONS 1 AND 2: 0
1. LITTLE INTEREST OR PLEASURE IN DOING THINGS: NOT AT ALL
2. FEELING DOWN, DEPRESSED OR HOPELESS: NOT AT ALL

## 2025-08-12 DIAGNOSIS — N52.9 ERECTILE DYSFUNCTION, UNSPECIFIED ERECTILE DYSFUNCTION TYPE: Primary | ICD-10-CM

## 2025-08-12 RX ORDER — SILDENAFIL 100 MG/1
100 TABLET, FILM COATED ORAL DAILY PRN
Qty: 12 TABLET | Refills: 3 | Status: SHIPPED | OUTPATIENT
Start: 2025-08-12 | End: 2026-08-12

## 2025-09-19 ENCOUNTER — APPOINTMENT (OUTPATIENT)
Dept: PRIMARY CARE | Facility: CLINIC | Age: 65
End: 2025-09-19
Payer: COMMERCIAL

## 2026-02-11 ENCOUNTER — APPOINTMENT (OUTPATIENT)
Dept: PRIMARY CARE | Facility: CLINIC | Age: 66
End: 2026-02-11
Payer: COMMERCIAL